# Patient Record
Sex: MALE | Race: WHITE | NOT HISPANIC OR LATINO
[De-identification: names, ages, dates, MRNs, and addresses within clinical notes are randomized per-mention and may not be internally consistent; named-entity substitution may affect disease eponyms.]

---

## 2018-08-29 PROBLEM — Z00.00 ENCOUNTER FOR PREVENTIVE HEALTH EXAMINATION: Status: ACTIVE | Noted: 2018-08-29

## 2018-08-31 ENCOUNTER — RECORD ABSTRACTING (OUTPATIENT)
Age: 68
End: 2018-08-31

## 2018-08-31 DIAGNOSIS — Z78.9 OTHER SPECIFIED HEALTH STATUS: ICD-10-CM

## 2018-08-31 DIAGNOSIS — Z86.39 PERSONAL HISTORY OF OTHER ENDOCRINE, NUTRITIONAL AND METABOLIC DISEASE: ICD-10-CM

## 2018-08-31 DIAGNOSIS — I25.2 OLD MYOCARDIAL INFARCTION: ICD-10-CM

## 2018-08-31 DIAGNOSIS — F19.90 OTHER PSYCHOACTIVE SUBSTANCE USE, UNSPECIFIED, UNCOMPLICATED: ICD-10-CM

## 2018-08-31 DIAGNOSIS — Z86.718 PERSONAL HISTORY OF OTHER VENOUS THROMBOSIS AND EMBOLISM: ICD-10-CM

## 2018-08-31 DIAGNOSIS — Z86.73 PERSONAL HISTORY OF TRANSIENT ISCHEMIC ATTACK (TIA), AND CEREBRAL INFARCTION W/OUT RESIDUAL DEFICITS: ICD-10-CM

## 2018-08-31 DIAGNOSIS — Z80.9 FAMILY HISTORY OF MALIGNANT NEOPLASM, UNSPECIFIED: ICD-10-CM

## 2018-08-31 DIAGNOSIS — Z86.79 PERSONAL HISTORY OF OTHER DISEASES OF THE CIRCULATORY SYSTEM: ICD-10-CM

## 2018-08-31 DIAGNOSIS — S54.22XD: ICD-10-CM

## 2018-08-31 DIAGNOSIS — Z95.5 PRESENCE OF CORONARY ANGIOPLASTY IMPLANT AND GRAFT: ICD-10-CM

## 2018-08-31 DIAGNOSIS — Z87.891 PERSONAL HISTORY OF NICOTINE DEPENDENCE: ICD-10-CM

## 2018-08-31 DIAGNOSIS — I10 ESSENTIAL (PRIMARY) HYPERTENSION: ICD-10-CM

## 2018-08-31 DIAGNOSIS — E07.9 DISORDER OF THYROID, UNSPECIFIED: ICD-10-CM

## 2018-08-31 LAB
HBA1C MFR BLD HPLC: 7.9
HBA1C MFR BLD: 7.9
HBA1C MFR BLD: 7.9
PODIATRY EVAL: NORMAL

## 2018-08-31 RX ORDER — BLOOD-GLUCOSE METER
W/DEVICE EACH MISCELLANEOUS
Refills: 0 | Status: ACTIVE | COMMUNITY

## 2018-08-31 RX ORDER — ASPIRIN 81 MG
81 TABLET, DELAYED RELEASE (ENTERIC COATED) ORAL DAILY
Refills: 0 | Status: ACTIVE | COMMUNITY

## 2018-08-31 RX ORDER — GLUCAGON 1 MG
1 KIT INJECTION
Refills: 0 | Status: ACTIVE | COMMUNITY

## 2018-09-04 ENCOUNTER — RESULT CHARGE (OUTPATIENT)
Age: 68
End: 2018-09-04

## 2018-09-04 ENCOUNTER — APPOINTMENT (OUTPATIENT)
Dept: ENDOCRINOLOGY | Facility: CLINIC | Age: 68
End: 2018-09-04
Payer: MEDICARE

## 2018-09-04 VITALS
SYSTOLIC BLOOD PRESSURE: 140 MMHG | DIASTOLIC BLOOD PRESSURE: 70 MMHG | HEART RATE: 63 BPM | HEIGHT: 68 IN | BODY MASS INDEX: 19.7 KG/M2 | WEIGHT: 130 LBS

## 2018-09-04 LAB — GLUCOSE BLDC GLUCOMTR-MCNC: 234

## 2018-09-04 PROCEDURE — 99214 OFFICE O/P EST MOD 30 MIN: CPT

## 2018-09-04 RX ORDER — CLOPIDOGREL 75 MG/1
75 TABLET, FILM COATED ORAL DAILY
Refills: 0 | Status: DISCONTINUED | COMMUNITY
End: 2018-09-04

## 2018-09-04 RX ORDER — BLOOD SUGAR DIAGNOSTIC
STRIP MISCELLANEOUS
Refills: 0 | Status: DISCONTINUED | COMMUNITY
End: 2018-09-04

## 2018-09-04 RX ORDER — BLOOD-GLUCOSE METER
EACH MISCELLANEOUS
Refills: 0 | Status: DISCONTINUED | COMMUNITY
End: 2018-09-04

## 2018-09-04 RX ORDER — MULTIVIT-MIN/FOLIC/VIT K/LYCOP 400-300MCG
25 MCG TABLET ORAL DAILY
Refills: 0 | Status: DISCONTINUED | COMMUNITY
End: 2018-09-04

## 2018-09-04 RX ORDER — SIMVASTATIN 20 MG/1
20 TABLET, FILM COATED ORAL
Refills: 0 | Status: ACTIVE | COMMUNITY

## 2018-09-04 RX ORDER — GABAPENTIN 300 MG/1
300 CAPSULE ORAL AT BEDTIME
Refills: 0 | Status: ACTIVE | COMMUNITY

## 2018-09-04 RX ORDER — CLOPIDOGREL BISULFATE 75 MG/1
75 TABLET, FILM COATED ORAL DAILY
Refills: 0 | Status: ACTIVE | COMMUNITY

## 2018-09-04 RX ORDER — OXYCODONE 10 MG/1
10 TABLET ORAL
Refills: 0 | Status: DISCONTINUED | COMMUNITY
End: 2018-09-04

## 2018-09-04 RX ORDER — LISINOPRIL 5 MG/1
5 TABLET ORAL DAILY
Refills: 0 | Status: DISCONTINUED | COMMUNITY
End: 2018-09-04

## 2018-09-04 RX ORDER — GABAPENTIN 600 MG/1
600 TABLET, COATED ORAL DAILY
Refills: 0 | Status: DISCONTINUED | COMMUNITY
End: 2018-09-04

## 2018-09-04 RX ORDER — SYRING-NEEDL,DISP,INSUL,0.3 ML 31 GX5/16"
31G X 5/16" SYRINGE, EMPTY DISPOSABLE MISCELLANEOUS
Refills: 0 | Status: DISCONTINUED | COMMUNITY
End: 2018-09-04

## 2018-09-04 RX ORDER — ATORVASTATIN CALCIUM 10 MG/1
10 TABLET, FILM COATED ORAL DAILY
Refills: 0 | Status: DISCONTINUED | COMMUNITY
End: 2018-09-04

## 2018-09-05 RX ORDER — FLASH GLUCOSE SENSOR
KIT MISCELLANEOUS
Qty: 0 | Refills: 0 | Status: COMPLETED | COMMUNITY
Start: 2018-09-04

## 2018-09-19 ENCOUNTER — RX CHANGE (OUTPATIENT)
Age: 68
End: 2018-09-19

## 2018-09-19 RX ORDER — LEVOTHYROXINE SODIUM 0.12 MG/1
125 TABLET ORAL DAILY
Refills: 0 | Status: DISCONTINUED | COMMUNITY
End: 2018-09-19

## 2018-09-30 ENCOUNTER — RESULT REVIEW (OUTPATIENT)
Age: 68
End: 2018-09-30

## 2018-09-30 ENCOUNTER — RESULT CHARGE (OUTPATIENT)
Age: 68
End: 2018-09-30

## 2018-10-03 ENCOUNTER — APPOINTMENT (OUTPATIENT)
Dept: ENDOCRINOLOGY | Facility: CLINIC | Age: 68
End: 2018-10-03
Payer: MEDICARE

## 2018-10-03 PROCEDURE — 97803 MED NUTRITION INDIV SUBSEQ: CPT

## 2018-10-17 ENCOUNTER — APPOINTMENT (OUTPATIENT)
Dept: ENDOCRINOLOGY | Facility: CLINIC | Age: 68
End: 2018-10-17
Payer: MEDICARE

## 2018-10-17 PROCEDURE — 97803 MED NUTRITION INDIV SUBSEQ: CPT

## 2018-11-07 ENCOUNTER — APPOINTMENT (OUTPATIENT)
Dept: ENDOCRINOLOGY | Facility: CLINIC | Age: 68
End: 2018-11-07

## 2018-11-08 ENCOUNTER — MEDICATION RENEWAL (OUTPATIENT)
Age: 68
End: 2018-11-08

## 2018-12-18 ENCOUNTER — MEDICATION RENEWAL (OUTPATIENT)
Age: 68
End: 2018-12-18

## 2019-01-02 ENCOUNTER — RECORD ABSTRACTING (OUTPATIENT)
Age: 69
End: 2019-01-02

## 2019-01-09 ENCOUNTER — APPOINTMENT (OUTPATIENT)
Dept: ENDOCRINOLOGY | Facility: CLINIC | Age: 69
End: 2019-01-09
Payer: MEDICARE

## 2019-01-09 VITALS
DIASTOLIC BLOOD PRESSURE: 70 MMHG | BODY MASS INDEX: 19.85 KG/M2 | WEIGHT: 131 LBS | HEIGHT: 68 IN | SYSTOLIC BLOOD PRESSURE: 142 MMHG | HEART RATE: 62 BPM

## 2019-01-09 LAB — GLUCOSE BLDC GLUCOMTR-MCNC: 224

## 2019-01-09 PROCEDURE — 82962 GLUCOSE BLOOD TEST: CPT

## 2019-01-09 PROCEDURE — 99214 OFFICE O/P EST MOD 30 MIN: CPT | Mod: 25

## 2019-01-09 NOTE — ASSESSMENT
[FreeTextEntry1] : 1. DM type 1, variable control. Needs CGMS to facilitate further adjustments. Will rx norris home.\par 2. Hypothyroid, clinically euthyroid on replacement\par 3. Hyperlipidemia, on therapy

## 2019-01-09 NOTE — HISTORY OF PRESENT ILLNESS
[FreeTextEntry1] : DM type 1 for 14 years\par \par s/p CVA 8/2016. 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 Vulnerable to hypoglycemia\par lantus 12\par humalog scale: pre-meal and HS. Wife will reduce insulin HS PRn\par 100-149 2\par 150-199 4\par 200-249 6\par 250-299 7\par 300-349 8\par 350-399 9\par 400+      10\par \par \par \par PMH:\par s/p right AKA\par s/p MI\par hypothyroid\par PVD left leg, s/p stent

## 2019-02-22 ENCOUNTER — MEDICATION RENEWAL (OUTPATIENT)
Age: 69
End: 2019-02-22

## 2019-03-25 ENCOUNTER — MEDICATION RENEWAL (OUTPATIENT)
Age: 69
End: 2019-03-25

## 2019-04-11 ENCOUNTER — MEDICATION RENEWAL (OUTPATIENT)
Age: 69
End: 2019-04-11

## 2019-05-22 ENCOUNTER — APPOINTMENT (OUTPATIENT)
Dept: ENDOCRINOLOGY | Facility: CLINIC | Age: 69
End: 2019-05-22
Payer: MEDICARE

## 2019-05-22 VITALS
SYSTOLIC BLOOD PRESSURE: 120 MMHG | HEART RATE: 63 BPM | DIASTOLIC BLOOD PRESSURE: 80 MMHG | HEIGHT: 68 IN | WEIGHT: 130 LBS | BODY MASS INDEX: 19.7 KG/M2

## 2019-05-22 LAB — GLUCOSE BLDC GLUCOMTR-MCNC: 290

## 2019-05-22 PROCEDURE — 82962 GLUCOSE BLOOD TEST: CPT

## 2019-05-22 PROCEDURE — 99214 OFFICE O/P EST MOD 30 MIN: CPT | Mod: 25

## 2019-05-22 NOTE — HISTORY OF PRESENT ILLNESS
[FreeTextEntry1] : DM type 1 for 14 years\par \par s/p CVA 8/2016. 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 Vulnerable to hypoglycemia\par lantus 12\par humalog scale: pre-meal and HS. Wife will reduce insulin HS PRn\par 100-149 2\par 150-199 4\par 200-249 6\par 250-299 7\par 300-349 8\par 350-399 9\par 400+      10\par \par \par \par PMH:\par s/p right AKA\par s/p MI\par hypothyroid\par PVD left leg, s/p stent

## 2019-05-22 NOTE — ASSESSMENT
[FreeTextEntry1] : 1. DM type 1, variable control. Needs CGMS to facilitate further adjustments. Will facilitate use of norris home. Pt. not a candidate for tight control due to co-morbidities and lack of awareness of hypoglycemia\par 2. Hypothyroid, clinically euthyroid on replacement\par 3. Hyperlipidemia, on therapy

## 2019-06-04 ENCOUNTER — OTHER (OUTPATIENT)
Age: 69
End: 2019-06-04

## 2019-06-11 ENCOUNTER — RX RENEWAL (OUTPATIENT)
Age: 69
End: 2019-06-11

## 2019-07-01 ENCOUNTER — MEDICATION RENEWAL (OUTPATIENT)
Age: 69
End: 2019-07-01

## 2019-09-09 ENCOUNTER — RX RENEWAL (OUTPATIENT)
Age: 69
End: 2019-09-09

## 2019-09-17 ENCOUNTER — MEDICATION RENEWAL (OUTPATIENT)
Age: 69
End: 2019-09-17

## 2019-10-03 ENCOUNTER — APPOINTMENT (OUTPATIENT)
Dept: ENDOCRINOLOGY | Facility: CLINIC | Age: 69
End: 2019-10-03

## 2020-03-10 ENCOUNTER — RX RENEWAL (OUTPATIENT)
Age: 70
End: 2020-03-10

## 2020-03-16 ENCOUNTER — APPOINTMENT (OUTPATIENT)
Dept: ENDOCRINOLOGY | Facility: CLINIC | Age: 70
End: 2020-03-16

## 2020-04-09 ENCOUNTER — RX RENEWAL (OUTPATIENT)
Age: 70
End: 2020-04-09

## 2020-05-06 RX ORDER — FLASH GLUCOSE SENSOR
KIT MISCELLANEOUS
Qty: 6 | Refills: 3 | Status: DISCONTINUED | COMMUNITY
Start: 2019-01-09 | End: 2020-05-06

## 2020-05-07 ENCOUNTER — APPOINTMENT (OUTPATIENT)
Dept: ENDOCRINOLOGY | Facility: CLINIC | Age: 70
End: 2020-05-07
Payer: MEDICARE

## 2020-05-07 PROCEDURE — 99442: CPT | Mod: CR

## 2020-05-07 NOTE — HISTORY OF PRESENT ILLNESS
[Verbal consent obtained from patient] : the patient, [unfilled] [FreeTextEntry1] : DM type 1 for 14 years\par \par s/p CVA 8/2016. 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 Vulnerable to hypoglycemia\par lantus 12\par humalog scale: pre-meal and HS. Wife will reduce insulin HS PRn\par 100-149 2\par 150-199 4\par 200-249 6\par 250-299 7\par 300-349 8\par 350-399 9\par 400+      10\par \par \par \par PMH:\par s/p right AKA\par s/p MI\par hypothyroid\par PVD left leg, s/p stent\par s/p fracture. Leg wounds getting home care\par morning hypoglycemic episodes

## 2020-07-08 ENCOUNTER — RX RENEWAL (OUTPATIENT)
Age: 70
End: 2020-07-08

## 2020-07-08 RX ORDER — BLOOD SUGAR DIAGNOSTIC
STRIP MISCELLANEOUS
Qty: 400 | Refills: 2 | Status: ACTIVE | COMMUNITY
Start: 2020-04-09 | End: 1900-01-01

## 2020-09-25 ENCOUNTER — APPOINTMENT (OUTPATIENT)
Dept: ENDOCRINOLOGY | Facility: CLINIC | Age: 70
End: 2020-09-25
Payer: MEDICARE

## 2020-09-25 VITALS — HEART RATE: 73 BPM | SYSTOLIC BLOOD PRESSURE: 118 MMHG | OXYGEN SATURATION: 98 % | DIASTOLIC BLOOD PRESSURE: 58 MMHG

## 2020-09-25 LAB — GLUCOSE BLDC GLUCOMTR-MCNC: 163

## 2020-09-25 PROCEDURE — 82962 GLUCOSE BLOOD TEST: CPT

## 2020-09-25 PROCEDURE — 99214 OFFICE O/P EST MOD 30 MIN: CPT | Mod: 25

## 2020-09-25 RX ORDER — GLUCAGON INJECTION, SOLUTION 1 MG/.2ML
1 INJECTION, SOLUTION SUBCUTANEOUS
Qty: 1 | Refills: 3 | Status: ACTIVE | COMMUNITY
Start: 2020-09-25 | End: 1900-01-01

## 2020-09-25 NOTE — HISTORY OF PRESENT ILLNESS
[FreeTextEntry1] : DM type 1 for 15 years\par \par s/p CVA 8/2016. 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 Vulnerable to hypoglycemia\par lantus 10\par humalog scale: pre-meal and HS. Wife will reduce insulin HS PRn\par 100-149 2\par 150-199 4\par 200-249 6\par 250-299 7\par 300-349 8\par 350-399 9\par 400+      10\par \par \par \par PMH:\par s/p right AKA\par fell out of wheelchair and fractured left tibia, now healing\par s/p MI\par hypothyroid on 125 mcg\par PVD left leg, s/p stent

## 2020-09-25 NOTE — ASSESSMENT
[FreeTextEntry1] : 1. DM type 1, variable control. Needs CGMS to facilitate further adjustments. Will facilitate use of norris home. Sample of norris 2 provided. Pt. not a candidate for tight control due to co-morbidities and lack of awareness of hypoglycemia. rx gvoke\par 2. Hypothyroid, clinically euthyroid on replacement\par \par Glucose Sensor Necessity:  This patient with diabetes (dx: E10.65) has been performing 4 glucose checks per day for the last 60 days utilizing a home blood glucose monitor   The patient is treated with insulin via4 injections daily  This patient requires frequent adjustments to his insulin treatment on the basis of therapeutic continuous glucose monitoring results by a sliding scale .  In addition, the patient has been to our office for an evaluation of his diabetes control within the past 6 months.  \par \par \par

## 2020-09-29 RX ORDER — FLASH GLUCOSE SENSOR
KIT MISCELLANEOUS
Qty: 6 | Refills: 1 | Status: ACTIVE | COMMUNITY
Start: 2020-09-29 | End: 1900-01-01

## 2020-09-29 RX ORDER — FLASH GLUCOSE SENSOR
KIT MISCELLANEOUS
Qty: 6 | Refills: 3 | Status: ACTIVE | COMMUNITY
Start: 2020-09-25 | End: 1900-01-01

## 2020-09-29 RX ORDER — FLASH GLUCOSE SCANNING READER
EACH MISCELLANEOUS
Qty: 1 | Refills: 0 | Status: ACTIVE | COMMUNITY
Start: 2020-09-29 | End: 1900-01-01

## 2020-10-09 RX ORDER — FLASH GLUCOSE SCANNING READER
EACH MISCELLANEOUS
Qty: 1 | Refills: 0 | Status: DISCONTINUED | COMMUNITY
Start: 2019-01-09 | End: 2020-10-09

## 2020-10-13 RX ORDER — LANCETS
EACH MISCELLANEOUS
Refills: 0 | Status: DISCONTINUED | COMMUNITY
End: 2020-10-13

## 2020-10-13 RX ORDER — LANCING DEVICE/LANCETS
KIT MISCELLANEOUS
Refills: 0 | Status: DISCONTINUED | COMMUNITY
End: 2020-10-13

## 2020-10-15 ENCOUNTER — NON-APPOINTMENT (OUTPATIENT)
Age: 70
End: 2020-10-15

## 2020-11-25 ENCOUNTER — NON-APPOINTMENT (OUTPATIENT)
Age: 70
End: 2020-11-25

## 2020-12-30 ENCOUNTER — NON-APPOINTMENT (OUTPATIENT)
Age: 70
End: 2020-12-30

## 2021-02-02 LAB
HBA1C MFR BLD HPLC: 9.7
LDLC SERPL DIRECT ASSAY-MCNC: 91.1
MICROALBUMIN/CREAT 24H UR-RTO: 111.96

## 2021-02-03 ENCOUNTER — APPOINTMENT (OUTPATIENT)
Dept: ENDOCRINOLOGY | Facility: CLINIC | Age: 71
End: 2021-02-03
Payer: MEDICARE

## 2021-02-03 VITALS
SYSTOLIC BLOOD PRESSURE: 116 MMHG | BODY MASS INDEX: 20.46 KG/M2 | OXYGEN SATURATION: 95 % | HEART RATE: 53 BPM | DIASTOLIC BLOOD PRESSURE: 60 MMHG | WEIGHT: 135 LBS | HEIGHT: 68 IN

## 2021-02-03 PROCEDURE — 99214 OFFICE O/P EST MOD 30 MIN: CPT | Mod: 25

## 2021-02-03 PROCEDURE — 95251 CONT GLUC MNTR ANALYSIS I&R: CPT

## 2021-02-03 RX ORDER — PEN NEEDLE, DIABETIC 29 G X1/2"
31G X 8 MM NEEDLE, DISPOSABLE MISCELLANEOUS
Qty: 3 | Refills: 3 | Status: ACTIVE | COMMUNITY
Start: 1900-01-01 | End: 1900-01-01

## 2021-02-03 NOTE — REASON FOR VISIT
[Follow - Up] : a follow-up visit [DM Type 1] : DM Type 1 [Hypothyroidism] : hypothyroidism [Other___] : [unfilled] [Spouse] : spouse

## 2021-02-03 NOTE — ASSESSMENT
[FreeTextEntry1] : 69 y/o male with Type 1 DM, Hypothyroidism and Hyperlipidemia. \par \par Plan: \par Type 1 DM:  Pt. not a candidate for tight control due to co-morbidities and lack of awareness of hypoglycemia.\par - continue Lantus 10 units in am \par - increase Humalog \par 100-149 2\par 150-199 4\par 200-249 5\par 250-299 7\par 300-349 8\par 350-399 9\par 400+ 10\par - bring in norris sensor in 1-2 weeks for download \par - repeat A1C in 3 months \par \par Glucose Sensor Necessity: This patient with diabetes performs 4 or more glucose checks per day utilizing a continuous blood glucose monitor. The patient is treated with insulin via 3 or more injections daily. This patient requires frequent adjustments to their insulin scale treatment on the basis of therapeutic continuous glucose monitoring results. In addition, the patient has been to our office for an evaluation of their diabetes control within the past 6 months.\par \par Hypothyroidism: elevated TSH\par - increase Levothyroxine 125 mcg daily \par - repeat TFTs in 4-6 weeks \par \par Hyperlipidemia: controlled, continue statin \par - repeat lipids in 3 months \par \par RTO in 6-8 weeks.

## 2021-02-03 NOTE — REVIEW OF SYSTEMS
[Fatigue] : no fatigue [Decreased Appetite] : appetite not decreased [Recent Weight Gain (___ Lbs)] : no recent weight gain [Recent Weight Loss (___ Lbs)] : no recent weight loss [Visual Field Defect] : no visual field defect [Blurred Vision] : no blurred vision [Dysphagia] : no dysphagia [Neck Pain] : no neck pain [Dysphonia] : no dysphonia [Chest Pain] : no chest pain [Palpitations] : no palpitations [Constipation] : no constipation [Diarrhea] : no diarrhea [Polyuria] : no polyuria [Dysuria] : no dysuria [Dry Skin] : no dry skin [Hair Loss] : no hair loss [Headaches] : no headaches [Tremors] : no tremors [Depression] : no depression [Anxiety] : no anxiety [Polydipsia] : no polydipsia [Swelling] : no swelling [FreeTextEntry2] : weight stable

## 2021-02-03 NOTE — PHYSICAL EXAM
[Alert] : alert [Well Nourished] : well nourished [No Acute Distress] : no acute distress [Well Developed] : well developed [Normal Sclera/Conjunctiva] : normal sclera/conjunctiva [EOMI] : extra ocular movement intact [No LAD] : no lymphadenopathy [Thyroid Not Enlarged] : the thyroid was not enlarged [No Thyroid Nodules] : no palpable thyroid nodules [No Respiratory Distress] : no respiratory distress [No Accessory Muscle Use] : no accessory muscle use [Normal Rate and Effort] : normal respiratory rate and effort [Normal S1, S2] : normal S1 and S2 [Normal Rate] : heart rate was normal [Regular Rhythm] : with a regular rhythm [Normal Bowel Sounds] : normal bowel sounds [Not Tender] : non-tender [Soft] : abdomen soft [No Rash] : no rash [Acanthosis Nigricans] : no acanthosis nigricans [No Tremors] : no tremors [Oriented x3] : oriented to person, place, and time [Normal Insight/Judgement] : insight and judgment were intact [Normal Mood] : the mood was normal [de-identified] : left upper lung congested - cleared with cough  [de-identified] : examined in wheelchair [de-identified] : pt. left foot wrapped and following up with podiatry

## 2021-02-03 NOTE — HISTORY OF PRESENT ILLNESS
[FreeTextEntry1] : Lives at home with wife \par s/p CVA 8/2016\par \par Pt. reports pain due to left foot wound and has been treated by Dr. Anguiano at [facility]. Recently started seeing podiatry 2 weeks ago, he is scheduled to follow up every 2 weeks at wound center. \par \par Quality: Type 1 DM \par Severity: uncontrolled \par Duration: over 15 years \par Onset: impaired eye sight \par Modifying Factors:  Vulnerable to hypoglycemia\par Associated Symptoms: neuropathy.\par \par Current Regimen:\par lantus 9 units in am \par humalog scale: pre-meal and HS. Wife will reduce insulin HS PRn\par 100-149 1\par 150-199 3\par 200-249 4\par 250-299 6\par 300-349 8\par 350-399 9\par 400+ 10\par \par Self blood sugar monitoring:\par Miguel Freestyle \par Average glucose 206\par GMI 8.2%\par 23% Very High\par 37% High\par 40% Target range\par 0% Low\par 0% Very low \par \par high blood sugars with occasional low blood sugars due to over correction \par current \par \par exercise: none \par \par Diet: 3 meals a day, limiting carbs\par \par \par Date of last eye exam: 1 year ago (+) DR\par Date of last foot exam: 2 weeks ago with podiatry Dr. Anguiano ([facility])\par Date of last flu vaccine:  2020\par Date of last Pneumovax: 2 years ago\par \par PMH: \par s/p right AKA\par fell out of wheelchair and fractured left tibia, now healing\par s/p MI\par hypothyroid on 125 mcg\par PVD left leg, s/p stent

## 2021-02-24 ENCOUNTER — NON-APPOINTMENT (OUTPATIENT)
Age: 71
End: 2021-02-24

## 2021-03-04 ENCOUNTER — NON-APPOINTMENT (OUTPATIENT)
Age: 71
End: 2021-03-04

## 2021-03-10 ENCOUNTER — NON-APPOINTMENT (OUTPATIENT)
Age: 71
End: 2021-03-10

## 2021-03-24 LAB
HBA1C MFR BLD HPLC: 9.9
LDLC SERPL DIRECT ASSAY-MCNC: 89.1
MICROALBUMIN/CREAT 24H UR-RTO: 16.58

## 2021-03-25 ENCOUNTER — APPOINTMENT (OUTPATIENT)
Dept: ENDOCRINOLOGY | Facility: CLINIC | Age: 71
End: 2021-03-25
Payer: MEDICARE

## 2021-03-25 VITALS
HEIGHT: 67 IN | SYSTOLIC BLOOD PRESSURE: 130 MMHG | DIASTOLIC BLOOD PRESSURE: 70 MMHG | BODY MASS INDEX: 21.19 KG/M2 | OXYGEN SATURATION: 96 % | WEIGHT: 135 LBS | HEART RATE: 50 BPM

## 2021-03-25 PROCEDURE — 95251 CONT GLUC MNTR ANALYSIS I&R: CPT

## 2021-03-25 PROCEDURE — 99214 OFFICE O/P EST MOD 30 MIN: CPT | Mod: 25

## 2021-03-25 NOTE — PHYSICAL EXAM
[Alert] : alert [Well Nourished] : well nourished [No Acute Distress] : no acute distress [Well Developed] : well developed [Normal Sclera/Conjunctiva] : normal sclera/conjunctiva [EOMI] : extra ocular movement intact [No LAD] : no lymphadenopathy [Thyroid Not Enlarged] : the thyroid was not enlarged [No Thyroid Nodules] : no palpable thyroid nodules [No Respiratory Distress] : no respiratory distress [No Accessory Muscle Use] : no accessory muscle use [Normal Rate and Effort] : normal respiratory rate and effort [Normal S1, S2] : normal S1 and S2 [Normal Rate] : heart rate was normal [Regular Rhythm] : with a regular rhythm [Normal Bowel Sounds] : normal bowel sounds [Not Tender] : non-tender [Soft] : abdomen soft [No Rash] : no rash [Acanthosis Nigricans] : no acanthosis nigricans [No Tremors] : no tremors [Oriented x3] : oriented to person, place, and time [Normal Affect] : the affect was normal [Normal Insight/Judgement] : insight and judgment were intact [Normal Mood] : the mood was normal [de-identified] : upper lungs congested  [de-identified] : examined in wheelchair [de-identified] : right AKA

## 2021-03-25 NOTE — ASSESSMENT
[FreeTextEntry1] : 69 y/o male with Type 1 DM, Hypothyroidism and Hyperlipidemia. \par \par Plan: \par Type 1 DM:  Pt. not a candidate for tight control due to co-morbidities and lack of awareness of hypoglycemia.\par - increase Lantus 11 units in am \par - continue Humalog scale \par - bring in norris reader in 1-2 weeks for download \par - repeat A1C in 2 months \par \par Glucose Sensor Necessity: This patient with diabetes performs 4 or more glucose checks per day utilizing a continuous blood glucose monitor. The patient is treated with insulin via 3 or more injections daily. This patient requires frequent adjustments to their insulin scale treatment on the basis of therapeutic continuous glucose monitoring results. In addition, the patient has been to our office for an evaluation of their diabetes control within the past 6 months.\par \par Hypothyroidism: euthyroid on replacement \par - continue Levothyroxine 125 mcg daily \par - repeat TFTs in 2 months \par \par Hyperlipidemia: controlled, continue statin \par - repeat lipids in 2 months \par \par RTO in 2 months with Dr. Puckett

## 2021-03-25 NOTE — REVIEW OF SYSTEMS
[Fatigue] : no fatigue [Decreased Appetite] : appetite not decreased [Recent Weight Gain (___ Lbs)] : no recent weight gain [Recent Weight Loss (___ Lbs)] : no recent weight loss [Visual Field Defect] : no visual field defect [Blurred Vision] : no blurred vision [Dysphagia] : no dysphagia [Neck Pain] : no neck pain [Dysphonia] : no dysphonia [Chest Pain] : no chest pain [Palpitations] : no palpitations [Constipation] : no constipation [Diarrhea] : no diarrhea [Polyuria] : no polyuria [Dysuria] : no dysuria [Dry Skin] : no dry skin [Hair Loss] : no hair loss [Headaches] : no headaches [Tremors] : no tremors [Depression] : no depression [Anxiety] : no anxiety [Polydipsia] : no polydipsia [FreeTextEntry2] : weight stable

## 2021-03-25 NOTE — HISTORY OF PRESENT ILLNESS
[FreeTextEntry1] : Lives at home with wife \par s/p CVA 8/2016\par \par Pt. reports pain due to left foot wound and has been treated by Dr. Anguiano at [facility]. Recently started seeing podiatry 2 weeks ago, he is scheduled to follow up every 2 weeks at wound center. \par \par Interval visit: Still seeing Dr. Anguiano at [facility] and is pleased with the healing progress. He has vascular appointment in 3 weeks.\par \par Quality: Type 1 DM \par Severity: uncontrolled \par Duration: over 15 years \par Onset: impaired eye sight \par Modifying Factors:  Vulnerable to hypoglycemia\par Associated Symptoms: neuropathy.\par \par Current Regimen:\par lantus 10 units in am \par humalog scale: pre-meal and HS. Wife will reduce insulin HS PRn\par 100-149 2\par 150-199 4\par 200-249 5\par 250-299 7\par 300-349 8\par 350-399 9\par 400+ 10\par \par Self blood sugar monitoring:\par Miguel Freestyle \par Average glucose 228\par GMI 8.8 %\par 37% Very High\par 43% High\par 20% Target range\par 0% Low\par 0% Very low \par \par high blood sugars with occasional low blood sugars due to over correction \par scanned current \par \par exercise: none \par \par Diet: 3 meals a day, limiting carbs\par \par \par Date of last eye exam: 1 year ago (+) DR\par Date of last foot exam: last week with podiatry Dr. Anguiano ([facility])\par Date of last flu vaccine:  2020\par Date of last Pneumovax: 2 years ago\par COVID Vaccine: received both injections \par \par PMH: \par s/p right AKA\par fell out of wheelchair and fractured left tibia, now healing\par s/p MI\par hypothyroid on 125 mcg\par PVD left leg, s/p stent

## 2021-03-30 NOTE — ASSESSMENT
[FreeTextEntry1] : DM type 1, variable control. reduce lantus to 10 units, and try to review upcoming glucose pattern\par Hypothyroid, with updated thyroid function tests to be ordered. No

## 2021-04-19 ENCOUNTER — NON-APPOINTMENT (OUTPATIENT)
Age: 71
End: 2021-04-19

## 2021-05-24 ENCOUNTER — NON-APPOINTMENT (OUTPATIENT)
Age: 71
End: 2021-05-24

## 2021-05-26 LAB
HBA1C MFR BLD HPLC: 9.8
LDLC SERPL DIRECT ASSAY-MCNC: 89.6
MICROALBUMIN/CREAT 24H UR-RTO: 48.83

## 2021-05-27 ENCOUNTER — APPOINTMENT (OUTPATIENT)
Dept: ENDOCRINOLOGY | Facility: CLINIC | Age: 71
End: 2021-05-27
Payer: MEDICARE

## 2021-05-27 VITALS
HEIGHT: 67 IN | HEART RATE: 57 BPM | OXYGEN SATURATION: 98 % | SYSTOLIC BLOOD PRESSURE: 140 MMHG | WEIGHT: 140 LBS | DIASTOLIC BLOOD PRESSURE: 70 MMHG | BODY MASS INDEX: 21.97 KG/M2

## 2021-05-27 LAB — GLUCOSE BLDC GLUCOMTR-MCNC: 339

## 2021-05-27 PROCEDURE — 99214 OFFICE O/P EST MOD 30 MIN: CPT | Mod: 25

## 2021-05-27 PROCEDURE — 82962 GLUCOSE BLOOD TEST: CPT

## 2021-05-27 NOTE — HISTORY OF PRESENT ILLNESS
[FreeTextEntry1] : Lives at home with wife \par s/p CVA 8/2016\par \par Pt. reports pain due to left foot wound and has been treated by Dr. Anguiano at [facility]. Recently started seeing podiatry 2 weeks ago, he is scheduled to follow up every 2 weeks at wound center. \par \par Interval visit: Still seeing Dr. Anguiano at [facility] and is pleased with the healing progress. \par \par Quality: Type 1 DM \par Severity: uncontrolled \par Duration: over 15 years \par Onset: impaired eye sight \par Modifying Factors:  Vulnerable to hypoglycemia\par Associated Symptoms: neuropathy.\par \par Current Regimen:\par lantus 10 units in am \par humalog scale: pre-meal and HS. Wife will reduce insulin HS PRn\par 100-149 2\par 150-199 4\par 200-249 5\par 250-299 7\par 300-349 8\par 350-399 9\par 400+ 10\par \par exercise: none \par \par Diet: 3 meals a day, limiting carbs\par \par \par Date of last eye exam: 1 year ago (+) DR\par Date of last foot exam: last week with podiatry Dr. Anguiano ([facility])\par Date of last flu vaccine:  2020\par Date of last Pneumovax: 2 years ago\par COVID Vaccine: received both injections \par \par PMH: \par s/p right AKA\par fell out of wheelchair and fractured left tibia, now healing\par s/p MI\par hypothyroid on 125 mcg\par PVD left leg, s/p stent

## 2021-05-27 NOTE — ASSESSMENT
[FreeTextEntry1] : DM type 1, variable control, but with good protection against hypoglycemia and erratic postprandial elevations. NOt a candidate for tighter control\par Hypothyroid, euthyroid on replacement

## 2021-07-08 ENCOUNTER — NON-APPOINTMENT (OUTPATIENT)
Age: 71
End: 2021-07-08

## 2021-08-27 ENCOUNTER — NON-APPOINTMENT (OUTPATIENT)
Age: 71
End: 2021-08-27

## 2021-09-20 ENCOUNTER — APPOINTMENT (OUTPATIENT)
Dept: ENDOCRINOLOGY | Facility: CLINIC | Age: 71
End: 2021-09-20
Payer: MEDICARE

## 2021-09-20 VITALS
HEART RATE: 44 BPM | SYSTOLIC BLOOD PRESSURE: 130 MMHG | OXYGEN SATURATION: 96 % | WEIGHT: 145 LBS | DIASTOLIC BLOOD PRESSURE: 78 MMHG | BODY MASS INDEX: 22.76 KG/M2 | HEIGHT: 67 IN

## 2021-09-20 DIAGNOSIS — E10.65 TYPE 1 DIABETES MELLITUS WITH HYPERGLYCEMIA: ICD-10-CM

## 2021-09-20 PROCEDURE — 99214 OFFICE O/P EST MOD 30 MIN: CPT

## 2021-09-20 NOTE — PHYSICAL EXAM
[Thyroid Not Enlarged] : the thyroid was not enlarged [Normal Rate] : heart rate was normal [Regular Rhythm] : with a regular rhythm

## 2021-09-20 NOTE — ASSESSMENT
[FreeTextEntry1] : 1. DM type 1, variable control.Pt. not a candidate for tight control due to co-morbidities and lack of awareness of hypoglycemia.\par 2. Hypothyroid,  euthyroid on replacement\par 3. hyperlipidemia controlled\par \par Glucose Sensor Necessity:  This patient with diabetes (dx: E10.65) has been performing 4 glucose checks per day for the last 60 days utilizing a home blood glucose monitor   The patient is treated with insulin via4 injections daily  This patient requires frequent adjustments to his insulin treatment on the basis of therapeutic continuous glucose monitoring results by a sliding scale .  In addition, the patient has been to our office for an evaluation of his diabetes control within the past 6 months.  \par \par \par

## 2021-09-20 NOTE — HISTORY OF PRESENT ILLNESS
[Continuous Glucose Monitoring] : Continuous Glucose Monitoring: Yes [Miguel] : Miguel [FreeTextEntry1] : DM type 1 for 16 years\par \par s/p CVA 8/2016. 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 Vulnerable to hypoglycemia\par lantus 10\par humalog scale: pre-meal and HS. Wife will reduce insulin HS PRn\par 100-149 2\par 150-199 4\par 200-249 6\par 250-299 7\par 300-349 8\par 350-399 9\par 400+      10\par \par \par \par PMH:\par s/p right AKA\par fell out of wheelchair and fractured left tibia, now healing\par s/p MI\par hypothyroid on 125 mcg\par PVD left leg, s/p stent [FreeTextEntry2] : 57 [FreeTextEntry3] : 41 [FreeTextEntry4] : 2 [de-identified] : 7.3 [FreeTextEntry5] : 166 [FreeTextEntry6] : 31.6

## 2021-10-10 ENCOUNTER — NON-APPOINTMENT (OUTPATIENT)
Age: 71
End: 2021-10-10

## 2021-11-19 ENCOUNTER — NON-APPOINTMENT (OUTPATIENT)
Age: 71
End: 2021-11-19

## 2021-12-29 ENCOUNTER — NON-APPOINTMENT (OUTPATIENT)
Age: 71
End: 2021-12-29

## 2022-01-20 ENCOUNTER — APPOINTMENT (OUTPATIENT)
Dept: ENDOCRINOLOGY | Facility: CLINIC | Age: 72
End: 2022-01-20

## 2022-02-15 ENCOUNTER — NON-APPOINTMENT (OUTPATIENT)
Age: 72
End: 2022-02-15

## 2022-02-24 ENCOUNTER — NON-APPOINTMENT (OUTPATIENT)
Age: 72
End: 2022-02-24

## 2022-03-13 ENCOUNTER — NON-APPOINTMENT (OUTPATIENT)
Age: 72
End: 2022-03-13

## 2022-03-22 ENCOUNTER — NON-APPOINTMENT (OUTPATIENT)
Age: 72
End: 2022-03-22

## 2022-04-29 ENCOUNTER — RX RENEWAL (OUTPATIENT)
Age: 72
End: 2022-04-29

## 2022-04-29 RX ORDER — INSULIN GLARGINE 100 [IU]/ML
100 INJECTION, SOLUTION SUBCUTANEOUS
Qty: 2 | Refills: 3 | Status: ACTIVE | COMMUNITY
Start: 2022-04-29 | End: 1900-01-01

## 2022-05-26 LAB
HBA1C MFR BLD HPLC: 9.9
LDLC SERPL CALC-MCNC: 91
MICROALBUMIN/CREAT 24H UR-RTO: 64

## 2022-05-27 ENCOUNTER — APPOINTMENT (OUTPATIENT)
Dept: ENDOCRINOLOGY | Facility: CLINIC | Age: 72
End: 2022-05-27
Payer: MEDICARE

## 2022-05-27 VITALS
OXYGEN SATURATION: 94 % | HEIGHT: 68 IN | BODY MASS INDEX: 7.58 KG/M2 | WEIGHT: 50 LBS | SYSTOLIC BLOOD PRESSURE: 148 MMHG | HEART RATE: 51 BPM | DIASTOLIC BLOOD PRESSURE: 70 MMHG

## 2022-05-27 LAB — GLUCOSE BLDC GLUCOMTR-MCNC: 164

## 2022-05-27 PROCEDURE — 82962 GLUCOSE BLOOD TEST: CPT

## 2022-05-27 PROCEDURE — 99214 OFFICE O/P EST MOD 30 MIN: CPT | Mod: 25

## 2022-05-27 PROCEDURE — 95251 CONT GLUC MNTR ANALYSIS I&R: CPT

## 2022-05-27 NOTE — HISTORY OF PRESENT ILLNESS
[Continuous Glucose Monitoring] : Continuous Glucose Monitoring: Yes [Miguel] : Miguel [FreeTextEntry1] : DM type 1 for 17 years\par \par s/p CVA 8/2016. 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 Vulnerable to hypoglycemia\par lantus 10\par humalog scale: pre-meal and HS. Wife will reduce insulin HS PRn\par 100-149 2\par 150-199 4\par 200-249 6\par 250-299 7\par 300-349 8\par 350-399 9\par 400+      10\par \par \par \par PMH:\par s/p right AKA\par fell out of wheelchair and fractured left tibia, now healing\par s/p MI\par hypothyroid on 125 mcg, increased to 137\par PVD left leg, s/p stent [FreeTextEntry2] : 44 [FreeTextEntry3] : 51 [FreeTextEntry4] : 5 [de-identified] : 76 [FreeTextEntry5] : 181 [FreeTextEntry6] : 39.8

## 2022-05-27 NOTE — ASSESSMENT
[FreeTextEntry1] : 1. DM type 1, variable control.Pt. not a candidate for tight control due to co-morbidities and lack of awareness of hypoglycemia.\par 2. Hypothyroid,  with recent rise in TSH, addressed\par 3. hyperlipidemia controlled\par \par Glucose Sensor Necessity:  This patient with diabetes (dx: E10.65) has been performing 4 glucose checks per day for the last 60 days utilizing a home blood glucose monitor   The patient is treated with insulin via4 injections daily  This patient requires frequent adjustments to his insulin treatment on the basis of therapeutic continuous glucose monitoring results by a sliding scale .  In addition, the patient has been to our office for an evaluation of his diabetes control within the past 6 months.  \par \par \par

## 2022-06-01 RX ORDER — INSULIN LISPRO 100 [IU]/ML
100 INJECTION, SOLUTION INTRAVENOUS; SUBCUTANEOUS
Qty: 3 | Refills: 0 | Status: ACTIVE | COMMUNITY
Start: 1900-01-01 | End: 1900-01-01

## 2022-07-15 ENCOUNTER — NON-APPOINTMENT (OUTPATIENT)
Age: 72
End: 2022-07-15

## 2022-10-14 ENCOUNTER — APPOINTMENT (OUTPATIENT)
Dept: ENDOCRINOLOGY | Facility: CLINIC | Age: 72
End: 2022-10-14

## 2022-10-14 VITALS
SYSTOLIC BLOOD PRESSURE: 126 MMHG | HEIGHT: 68 IN | BODY MASS INDEX: 20.46 KG/M2 | OXYGEN SATURATION: 95 % | HEART RATE: 54 BPM | WEIGHT: 135 LBS | DIASTOLIC BLOOD PRESSURE: 60 MMHG

## 2022-10-14 LAB — GLUCOSE BLDC GLUCOMTR-MCNC: 437

## 2022-10-14 PROCEDURE — 99214 OFFICE O/P EST MOD 30 MIN: CPT

## 2022-10-14 PROCEDURE — 82962 GLUCOSE BLOOD TEST: CPT

## 2022-10-14 NOTE — HISTORY OF PRESENT ILLNESS
[Continuous Glucose Monitoring] : Continuous Glucose Monitoring: Yes [Miguel] : Miguel [FreeTextEntry1] : DM type 1 for 17 years\par \par s/p CVA 8/2016. 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 Vulnerable to hypoglycemia\par Pt. now residing in [place], s/p COVID\par lantus 10\par humalog scale: pre-meal and HS. Wife will reduce insulin HS PRn\par \par 150-199 2\par 200-249 4\par 250-299 5\par 300-349 6\par 350-399 8\par 400+      10\par \par \par \par PMH:\par s/p right AKA\par fell out of wheelchair and fractured left tibia, now healing\par s/p MI\par hypothyroid on 125 mcg, increased to 137\par PVD left leg, s/p stent [FreeTextEntry2] : 44 [FreeTextEntry3] : 51 [FreeTextEntry4] : 5 [de-identified] : 76 [FreeTextEntry5] : 181 [FreeTextEntry6] : 39.8

## 2022-10-14 NOTE — PHYSICAL EXAM
[Alert] : alert [No Acute Distress] : no acute distress [Thyroid Not Enlarged] : the thyroid was not enlarged [Normal Rate] : heart rate was normal

## 2023-02-14 ENCOUNTER — NON-APPOINTMENT (OUTPATIENT)
Age: 73
End: 2023-02-14

## 2023-02-17 ENCOUNTER — NON-APPOINTMENT (OUTPATIENT)
Age: 73
End: 2023-02-17

## 2023-05-09 ENCOUNTER — APPOINTMENT (OUTPATIENT)
Dept: ENDOCRINOLOGY | Facility: CLINIC | Age: 73
End: 2023-05-09
Payer: MEDICARE

## 2023-05-09 ENCOUNTER — RESULT CHARGE (OUTPATIENT)
Age: 73
End: 2023-05-09

## 2023-05-09 VITALS
DIASTOLIC BLOOD PRESSURE: 66 MMHG | WEIGHT: 118 LBS | HEIGHT: 68 IN | BODY MASS INDEX: 17.88 KG/M2 | HEART RATE: 49 BPM | SYSTOLIC BLOOD PRESSURE: 122 MMHG

## 2023-05-09 LAB
GLUCOSE BLDC GLUCOMTR-MCNC: 192
HBA1C MFR BLD HPLC: 8.5

## 2023-05-09 PROCEDURE — 82962 GLUCOSE BLOOD TEST: CPT

## 2023-05-09 PROCEDURE — 99214 OFFICE O/P EST MOD 30 MIN: CPT | Mod: 25

## 2023-05-09 RX ORDER — LEVOTHYROXINE SODIUM 0.15 MG/1
150 TABLET ORAL
Qty: 90 | Refills: 1 | Status: ACTIVE | COMMUNITY
Start: 2019-06-11

## 2023-05-09 NOTE — ASSESSMENT
[FreeTextEntry1] : 1. DM type 1,  uncontrolled. Pt. not a candidate for tight control due to co-morbidities and lack of awareness of hypoglycemia. Increase Glargine to 14 units at bedtime, continue Admelog scale. Need medication administration log to verify how much insulin patient is getting. Repeat A1c before next visit. \par \par 2. Hypothyroidism, needs updated labs, continue Levothyroxine dose for now \par \par 3. Hyperlipidemia: LDL elevated - most likely due to diet, continue statin, repeat lipids before next visit\par \par Glucose Sensor Necessity:  This patient with diabetes (dx: E10.65) has been performing 4 glucose checks per day for the last 60 days utilizing a home blood glucose monitor   The patient is treated with insulin via4 injections daily  This patient requires frequent adjustments to his insulin treatment on the basis of therapeutic continuous glucose monitoring results by a sliding scale .  In addition, the patient has been to our office for an evaluation of his diabetes control within the past 6 months.  \par \par RTO in 6 weeks with NP \par RTO in 5 months with Dr. Puckett\par \par

## 2023-05-09 NOTE — PHYSICAL EXAM
[Alert] : alert [Well Nourished] : well nourished [No Acute Distress] : no acute distress [Well Developed] : well developed [Normal Sclera/Conjunctiva] : normal sclera/conjunctiva [No Proptosis] : no proptosis [No Respiratory Distress] : no respiratory distress [No Accessory Muscle Use] : no accessory muscle use [Normal Rate and Effort] : normal respiratory rate and effort [Clear to Auscultation] : lungs were clear to auscultation bilaterally [Normal S1, S2] : normal S1 and S2 [Normal Rate] : heart rate was normal [Regular Rhythm] : with a regular rhythm [Normal Bowel Sounds] : normal bowel sounds [Not Tender] : non-tender [Soft] : abdomen soft [No Rash] : no rash [Acanthosis Nigricans] : no acanthosis nigricans [No Tremors] : no tremors [Oriented x3] : oriented to person, place, and time [Normal Insight/Judgement] : insight and judgment were intact [de-identified] : bilateral BKA

## 2023-05-09 NOTE — HISTORY OF PRESENT ILLNESS
[FreeTextEntry1] : DM type 1 for 17 years\par \par s/p CVA 8/2016. 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 Vulnerable to hypoglycemia\par glargine 12 units at bedtime\par humalog scale: pre-meal and HS. Wife will reduce insulin HS PRn\par 100-149 6\par 150-199 5\par 200-249 6\par 250-299 7\par 300-349 8\par 350-399 9\par 400+      10\par \par \par \par PMH:\par s/p right and left AKA\par fell out of wheelchair and fractured left tibia, now healing\par s/p MI\par hypothyroid on 125 mcg, increased to 150\par PVD left leg, s/p stent

## 2023-05-09 NOTE — REVIEW OF SYSTEMS
[Fatigue] : no fatigue [Decreased Appetite] : appetite not decreased [Visual Field Defect] : no visual field defect [Blurred Vision] : no blurred vision [Dysphagia] : no dysphagia [Neck Pain] : no neck pain [Dysphonia] : no dysphonia [Chest Pain] : no chest pain [Palpitations] : no palpitations [Constipation] : no constipation [Diarrhea] : no diarrhea [Polyuria] : no polyuria [Dysuria] : no dysuria [Headaches] : no headaches [Tremors] : no tremors [Depression] : no depression [Anxiety] : no anxiety [Polydipsia] : no polydipsia

## 2023-06-14 ENCOUNTER — RESULT CHARGE (OUTPATIENT)
Age: 73
End: 2023-06-14

## 2023-06-14 ENCOUNTER — APPOINTMENT (OUTPATIENT)
Dept: ENDOCRINOLOGY | Facility: CLINIC | Age: 73
End: 2023-06-14
Payer: MEDICARE

## 2023-06-14 VITALS
SYSTOLIC BLOOD PRESSURE: 116 MMHG | OXYGEN SATURATION: 95 % | HEART RATE: 54 BPM | HEIGHT: 68 IN | WEIGHT: 115 LBS | DIASTOLIC BLOOD PRESSURE: 72 MMHG | BODY MASS INDEX: 17.43 KG/M2

## 2023-06-14 DIAGNOSIS — E10.65 TYPE 1 DIABETES MELLITUS WITH HYPERGLYCEMIA: ICD-10-CM

## 2023-06-14 DIAGNOSIS — E03.9 HYPOTHYROIDISM, UNSPECIFIED: ICD-10-CM

## 2023-06-14 DIAGNOSIS — E78.5 HYPERLIPIDEMIA, UNSPECIFIED: ICD-10-CM

## 2023-06-14 LAB — GLUCOSE BLDC GLUCOMTR-MCNC: 126

## 2023-06-14 PROCEDURE — 99214 OFFICE O/P EST MOD 30 MIN: CPT | Mod: 25

## 2023-06-14 PROCEDURE — 82962 GLUCOSE BLOOD TEST: CPT

## 2023-06-14 NOTE — REVIEW OF SYSTEMS
[Fatigue] : no fatigue [Decreased Appetite] : appetite not decreased [Recent Weight Gain (___ Lbs)] : no recent weight gain [Recent Weight Loss (___ Lbs)] : no recent weight loss [Visual Field Defect] : no visual field defect [Blurred Vision] : no blurred vision [Dysphagia] : no dysphagia [Neck Pain] : no neck pain [Dysphonia] : no dysphonia [Chest Pain] : no chest pain [Palpitations] : no palpitations [Constipation] : no constipation [Diarrhea] : no diarrhea [Polyuria] : no polyuria [Dysuria] : no dysuria [Headaches] : no headaches [Tremors] : no tremors [Depression] : no depression [Anxiety] : no anxiety [Polydipsia] : no polydipsia [FreeTextEntry2] : weight stable, weight 115 lbs

## 2023-06-14 NOTE — ASSESSMENT
[FreeTextEntry1] : 1. DM type 1,  uncontrolled. Pt. not a candidate for tight control due to co-morbidities and lack of awareness of hypoglycemia. Discontinue Metformin.  Continue Glargine and Admelog scale for now. Need medication administration log to verify how much insulin patient is getting. Repeat A1c before next visit. \par \par 2. Hypothyroidism, needs updated labs, continue Levothyroxine dose for now \par \par 3. Hyperlipidemia: LDL elevated - most likely due to diet, continue statin, repeat lipids before next visit\par \par \par \par RTO in 4 months with Dr. Puckett\par \par

## 2023-06-14 NOTE — HISTORY OF PRESENT ILLNESS
[FreeTextEntry1] : DM type 1 for 17 years\par \par s/p CVA 8/2016. Lives at [facility] in [place]\par diabetes complicated by neuropathy. Vulnerable to hypoglycemia\par He was started on Metformin at nursing home\par glargine 26 units at bedtime\par humalog 10 units plus scale: pre-meal and HS. Wife will reduce insulin HS PRn\par 100-149 6\par 150-199 5\par 200-249 6\par 250-299 7\par 300-349 8\par 350-399 9\par 400+      10\par \par blood sugar log reviewed: fasting this morning 87, yesterday morning 479, during the day blood sugars range from 100-300\par Fasting blood sugars are usually in the 400s \par last week  - wife unsure what happened \par \par \par PMH:\par s/p right and left AKA\par fell out of wheelchair and fractured left tibia, now healing\par s/p MI\par hypothyroid on 125 mcg, increased to 150\par PVD left leg, s/p stent

## 2023-06-14 NOTE — PHYSICAL EXAM
[Alert] : alert [Well Nourished] : well nourished [No Acute Distress] : no acute distress [Well Developed] : well developed [Normal Sclera/Conjunctiva] : normal sclera/conjunctiva [No Proptosis] : no proptosis [No Respiratory Distress] : no respiratory distress [No Accessory Muscle Use] : no accessory muscle use [Normal Rate and Effort] : normal respiratory rate and effort [Clear to Auscultation] : lungs were clear to auscultation bilaterally [Normal S1, S2] : normal S1 and S2 [Normal Rate] : heart rate was normal [Regular Rhythm] : with a regular rhythm [Normal Bowel Sounds] : normal bowel sounds [Not Tender] : non-tender [Soft] : abdomen soft [No Rash] : no rash [Acanthosis Nigricans] : no acanthosis nigricans [No Tremors] : no tremors [Oriented x3] : oriented to person, place, and time [Normal Insight/Judgement] : insight and judgment were intact [de-identified] : bilateral BKA

## 2023-10-27 ENCOUNTER — APPOINTMENT (OUTPATIENT)
Dept: ENDOCRINOLOGY | Facility: CLINIC | Age: 73
End: 2023-10-27

## 2025-02-05 NOTE — REASON FOR VISIT
yes [Follow - Up] : a follow-up visit [DM Type 1] : DM Type 1 [Hypothyroidism] : hypothyroidism [Other___] : [unfilled] [Spouse] : spouse